# Patient Record
Sex: FEMALE | Race: WHITE | NOT HISPANIC OR LATINO
[De-identification: names, ages, dates, MRNs, and addresses within clinical notes are randomized per-mention and may not be internally consistent; named-entity substitution may affect disease eponyms.]

---

## 2018-04-12 ENCOUNTER — APPOINTMENT (OUTPATIENT)
Dept: PULMONOLOGY | Facility: CLINIC | Age: 78
End: 2018-04-12
Payer: MEDICARE

## 2018-04-12 VITALS
HEIGHT: 60 IN | DIASTOLIC BLOOD PRESSURE: 98 MMHG | TEMPERATURE: 98.2 F | HEART RATE: 67 BPM | WEIGHT: 164 LBS | RESPIRATION RATE: 14 BRPM | SYSTOLIC BLOOD PRESSURE: 140 MMHG | OXYGEN SATURATION: 98 % | BODY MASS INDEX: 32.2 KG/M2

## 2018-04-12 PROCEDURE — 94727 GAS DIL/WSHOT DETER LNG VOL: CPT

## 2018-04-12 PROCEDURE — 94729 DIFFUSING CAPACITY: CPT

## 2018-04-12 PROCEDURE — 94060 EVALUATION OF WHEEZING: CPT

## 2018-04-12 PROCEDURE — 99215 OFFICE O/P EST HI 40 MIN: CPT | Mod: 25

## 2018-04-12 RX ORDER — ALBUTEROL SULFATE 90 UG/1
108 (90 BASE) AEROSOL, METERED RESPIRATORY (INHALATION) EVERY 6 HOURS
Qty: 1 | Refills: 2 | Status: ACTIVE | COMMUNITY
Start: 2018-04-12 | End: 1900-01-01

## 2018-09-20 ENCOUNTER — FORM ENCOUNTER (OUTPATIENT)
Age: 78
End: 2018-09-20

## 2018-09-26 ENCOUNTER — FORM ENCOUNTER (OUTPATIENT)
Age: 78
End: 2018-09-26

## 2018-10-11 ENCOUNTER — APPOINTMENT (OUTPATIENT)
Dept: PULMONOLOGY | Facility: CLINIC | Age: 78
End: 2018-10-11

## 2018-11-30 ENCOUNTER — APPOINTMENT (OUTPATIENT)
Dept: PULMONOLOGY | Facility: CLINIC | Age: 78
End: 2018-11-30

## 2019-02-01 ENCOUNTER — APPOINTMENT (OUTPATIENT)
Dept: PULMONOLOGY | Facility: CLINIC | Age: 79
End: 2019-02-01
Payer: MEDICARE

## 2019-02-01 VITALS — SYSTOLIC BLOOD PRESSURE: 130 MMHG | DIASTOLIC BLOOD PRESSURE: 80 MMHG

## 2019-02-01 VITALS
TEMPERATURE: 98.5 F | RESPIRATION RATE: 14 BRPM | OXYGEN SATURATION: 96 % | HEART RATE: 67 BPM | WEIGHT: 157 LBS | BODY MASS INDEX: 30.66 KG/M2 | DIASTOLIC BLOOD PRESSURE: 90 MMHG | SYSTOLIC BLOOD PRESSURE: 130 MMHG

## 2019-02-01 DIAGNOSIS — R05 COUGH: ICD-10-CM

## 2019-02-01 DIAGNOSIS — Z86.39 PERSONAL HISTORY OF OTHER ENDOCRINE, NUTRITIONAL AND METABOLIC DISEASE: ICD-10-CM

## 2019-02-01 PROCEDURE — 94060 EVALUATION OF WHEEZING: CPT

## 2019-02-01 PROCEDURE — 99212 OFFICE O/P EST SF 10 MIN: CPT | Mod: 25

## 2019-02-01 PROCEDURE — 94729 DIFFUSING CAPACITY: CPT

## 2019-02-01 PROCEDURE — 99215 OFFICE O/P EST HI 40 MIN: CPT | Mod: 25

## 2019-02-01 PROCEDURE — 94727 GAS DIL/WSHOT DETER LNG VOL: CPT

## 2019-02-01 RX ORDER — METFORMIN HYDROCHLORIDE 500 MG/1
500 TABLET, COATED ORAL
Refills: 0 | Status: ACTIVE | COMMUNITY

## 2019-02-01 RX ORDER — ROSUVASTATIN CALCIUM 5 MG/1
TABLET, FILM COATED ORAL
Refills: 0 | Status: ACTIVE | COMMUNITY

## 2019-02-01 NOTE — DISCUSSION/SUMMARY
[FreeTextEntry1] : In summary she is a 78 year-old woman with type II diabetes, hyperlipidemia, mild obstructive airways disease and multiple small pulmonary nodules. She never smoked. \par \par Her airways disease has improved since she removed the carpeting and birds from her home. The pulmonary nodules have been stable.\par \par Can continue to use Ventolin as needed, although has not needed it. \par \par I will see her again in one year. \par \par

## 2019-02-01 NOTE — HISTORY OF PRESENT ILLNESS
[FreeTextEntry1] : She came for a follow up evaluation. \par \par She is feeling well. No cough, wheezing shortness of breath. Does not have any carpeting in the house. No longer has birds in her house. She never smoked.

## 2019-02-01 NOTE — REVIEW OF SYSTEMS
[Arthralgias] : arthralgias [Fatigue] : no fatigue [Nasal Congestion] : no nasal congestion [Cough] : no cough [Sputum] : not coughing up ~M sputum [Hemoptysis] : no hemoptysis [Dyspnea] : no dyspnea [Chest Tightness] : no chest tightness [Wheezing] : no wheezing [Hypertension] : no ~T hypertension [Chest Discomfort] : no chest discomfort [Heartburn] : no heartburn [Reflux] : no reflux [Dysuria] : no dysuria [Myalgias] : no myalgias [Rash] : no [unfilled] rash [Anemia] : no anemia [Headache] : no headache [Depression] : no depression [Snoring] : no snoring

## 2019-02-01 NOTE — PHYSICAL EXAM
[Normal Appearance] : normal appearance [Neck Appearance] : the appearance of the neck was normal [Jugular Venous Distention Increased] : there was no jugular-venous distention [Heart Sounds] : normal S1 and S2 [Auscultation Breath Sounds / Voice Sounds] : lungs were clear to auscultation bilaterally [Abdomen Tenderness] : non-tender [Abnormal Walk] : normal gait [Deep Tendon Reflexes (DTR)] : deep tendon reflexes were 2+ and symmetric [Oriented To Time, Place, And Person] : oriented to person, place, and time [Normal Oral Mucosa] : normal oral mucosa [Nail Clubbing] : no clubbing of the fingernails [Cyanosis, Localized] : no localized cyanosis [Skin Turgor] : normal skin turgor [] : no rash [Erythema] : no erythema of the pharynx

## 2019-02-01 NOTE — PROCEDURE
[FreeTextEntry1] : PFT 4/12/18: There was mild obstructive airways disease.  A significant improvement was noted after inhalation of a bronchodilator. Lung volumes were normal. Diffusion was mildly reduced.\par \par PFT 2/1/19: Small airways disease. Lung volumes were normal. Diffusion was normal. Mild response noted after bronchodilator. \par \par CT of the chest April 23, 2018: Multiple pulmonary nodules were noted stable when compared to the prior study of 3/31/16.  [place] to be due to a benign etiology.

## 2019-07-01 ENCOUNTER — RESULT REVIEW (OUTPATIENT)
Age: 79
End: 2019-07-01

## 2020-03-09 ENCOUNTER — APPOINTMENT (OUTPATIENT)
Dept: PULMONOLOGY | Facility: CLINIC | Age: 80
End: 2020-03-09
Payer: MEDICARE

## 2020-03-09 VITALS
WEIGHT: 153 LBS | BODY MASS INDEX: 29.88 KG/M2 | TEMPERATURE: 98.9 F | OXYGEN SATURATION: 96 % | DIASTOLIC BLOOD PRESSURE: 98 MMHG | HEART RATE: 61 BPM | SYSTOLIC BLOOD PRESSURE: 148 MMHG

## 2020-03-09 PROCEDURE — 99214 OFFICE O/P EST MOD 30 MIN: CPT

## 2020-03-09 NOTE — REASON FOR VISIT
[Initial Evaluation] : an initial evaluation [Cough] : cough [Follow-Up] : a follow-up visit [Abnormal CXR/ Chest CT] : an abnormal CXR/ chest CT

## 2020-03-09 NOTE — PHYSICAL EXAM
[Jugular Venous Distention Increased] : there was no jugular-venous distention [Heart Sounds] : normal S1 and S2 [Auscultation Breath Sounds / Voice Sounds] : lungs were clear to auscultation bilaterally [Abdomen Tenderness] : non-tender [Abnormal Walk] : normal gait [Deep Tendon Reflexes (DTR)] : deep tendon reflexes were 2+ and symmetric [Oriented To Time, Place, And Person] : oriented to person, place, and time [Normal Oral Mucosa] : normal oral mucosa [Nail Clubbing] : no clubbing of the fingernails [Skin Turgor] : normal skin turgor [] : no rash [General Appearance - In No Acute Distress] : no acute distress [Erythema] : no erythema of the pharynx [Cyanosis, Localized] : no localized cyanosis [Impaired Insight] : insight and judgment were intact

## 2020-03-09 NOTE — DISCUSSION/SUMMARY
[FreeTextEntry1] : In summary she is a 79 year-old woman with type II diabetes, hyperlipidemia, mild obstructive airways disease and multiple small pulmonary nodules. She never smoked. \par \par The airways disease improved when she removed the carpeting and birds from her home. The pulmonary nodules have been stable. Prefers no further monitoring. \par \par Can continue to use Ventolin as needed, although has not needed it. \par \par I will see her again in one year.

## 2020-03-09 NOTE — REVIEW OF SYSTEMS
[Arthralgias] : arthralgias [Fever] : no fever [Fatigue] : no fatigue [Nasal Congestion] : no nasal congestion [Cough] : no cough [Sputum] : not coughing up ~M sputum [Dyspnea] : no dyspnea [Chest Tightness] : no chest tightness [Wheezing] : no wheezing [Hypertension] : no ~T hypertension [Chest Discomfort] : no chest discomfort [Heartburn] : no heartburn [Reflux] : no reflux [Dysuria] : no dysuria [Myalgias] : no myalgias [Rash] : no [unfilled] rash [Anemia] : no anemia [Headache] : no headache [Depression] : no depression [Difficulty Initiating Sleep] : no difficulty falling asleep [Snoring] : no snoring

## 2020-03-09 NOTE — PROCEDURE
[FreeTextEntry1] : PFT 4/12/18: There was mild obstructive airways disease.  A significant improvement was noted after inhalation of a bronchodilator. Lung volumes were normal. Diffusion was mildly reduced.\par \par PFT 2/1/19: Small airways disease. Lung volumes were normal. Diffusion was normal. Mild response noted after bronchodilator. \par \par CT Chest 4/23/18: Multiple pulmonary nodules were noted stable when compared to the prior study of 3/31/16. The largest was 5 mm. Felt to be due to a benign etiology.

## 2021-03-17 ENCOUNTER — APPOINTMENT (OUTPATIENT)
Dept: PULMONOLOGY | Facility: CLINIC | Age: 81
End: 2021-03-17

## 2021-04-26 ENCOUNTER — NON-APPOINTMENT (OUTPATIENT)
Age: 81
End: 2021-04-26

## 2021-04-26 ENCOUNTER — APPOINTMENT (OUTPATIENT)
Dept: PULMONOLOGY | Facility: CLINIC | Age: 81
End: 2021-04-26
Payer: MEDICARE

## 2021-04-26 VITALS
HEART RATE: 84 BPM | SYSTOLIC BLOOD PRESSURE: 180 MMHG | WEIGHT: 152 LBS | TEMPERATURE: 98.6 F | DIASTOLIC BLOOD PRESSURE: 70 MMHG | BODY MASS INDEX: 29.69 KG/M2 | OXYGEN SATURATION: 97 %

## 2021-04-26 DIAGNOSIS — R06.02 SHORTNESS OF BREATH: ICD-10-CM

## 2021-04-26 DIAGNOSIS — R91.8 OTHER NONSPECIFIC ABNORMAL FINDING OF LUNG FIELD: ICD-10-CM

## 2021-04-26 PROCEDURE — 99072 ADDL SUPL MATRL&STAF TM PHE: CPT

## 2021-04-26 PROCEDURE — 99213 OFFICE O/P EST LOW 20 MIN: CPT

## 2021-04-26 RX ORDER — DORZOLAMIDE HYDROCHLORIDE TIMOLOL MALEATE 20; 5 MG/ML; MG/ML
22.3-6.8 SOLUTION/ DROPS OPHTHALMIC
Qty: 20 | Refills: 0 | Status: ACTIVE | COMMUNITY
Start: 2021-04-20

## 2021-04-26 RX ORDER — LATANOPROST/PF 0.005 %
0.01 DROPS OPHTHALMIC (EYE)
Qty: 7 | Refills: 0 | Status: ACTIVE | COMMUNITY
Start: 2021-04-20

## 2021-04-26 RX ORDER — TELMISARTAN AND HYDROCHLOROTHIAZIDE 40; 12.5 MG/1; MG/1
40-12.5 TABLET ORAL
Qty: 30 | Refills: 0 | Status: ACTIVE | COMMUNITY
Start: 2021-01-08

## 2021-04-26 NOTE — DISCUSSION/SUMMARY
[FreeTextEntry1] : She is a 81 year-old woman with history of hypertension, type II diabetes, hyperlipidemia, mild obstructive airways disease and multiple small pulmonary nodules. She never smoked. The airways disease improved when she removed carpeting and birds from her home. The pulmonary nodules have been stable. \par \par Presented with dyspnea on minimal exertion. Lungs were clear on exam. No other abnormal findings. \par \par Chest x-ray requested. A PFT will be obtained after a nasopharyngeal swab for COVID-19-PCR has been obtained and the result is negative. \par \par Follow up in 2- 4 weeks.

## 2021-04-26 NOTE — PHYSICAL EXAM
[General Appearance - In No Acute Distress] : no acute distress [Jugular Venous Distention Increased] : there was no jugular-venous distention [Heart Sounds] : normal S1 and S2 [Auscultation Breath Sounds / Voice Sounds] : lungs were clear to auscultation bilaterally [Abnormal Walk] : normal gait [Deep Tendon Reflexes (DTR)] : deep tendon reflexes were 2+ and symmetric [Oriented To Time, Place, And Person] : oriented to person, place, and time [Normal Oral Mucosa] : normal oral mucosa [Nail Clubbing] : no clubbing of the fingernails [Cyanosis, Localized] : no localized cyanosis [Skin Turgor] : normal skin turgor [] : no rash

## 2021-04-26 NOTE — REVIEW OF SYSTEMS
[Arthralgias] : arthralgias [Dyspnea] : dyspnea [Fever] : no fever [Nasal Congestion] : no nasal congestion [Cough] : no cough [Chest Tightness] : no chest tightness [Wheezing] : no wheezing [Hypertension] : no ~T hypertension [Chest Discomfort] : no chest discomfort [PND] : no PND [Palpitations] : no palpitations [Edema] : ~T edema was not present [Heartburn] : no heartburn [Reflux] : no reflux [Dysuria] : no dysuria [Myalgias] : no myalgias [Rash] : no [unfilled] rash [Anemia] : no anemia [Headache] : no headache [Depression] : no depression [Difficulty Initiating Sleep] : no difficulty falling asleep [Snoring] : no snoring

## 2021-04-26 NOTE — HISTORY OF PRESENT ILLNESS
[Never] : never [TextBox_4] : Complains of shortness of breath with minimal exertion and at rest. \par \par No coughing or wheezing. No complaint of chest pain or pressure. No fever, chills or sweats. No sick contacts. \par \par Recieved the COVID 19 vaccine.

## 2021-05-17 LAB — SARS-COV-2 N GENE NPH QL NAA+PROBE: NOT DETECTED

## 2022-10-11 DIAGNOSIS — Z86.39 PERSONAL HISTORY OF OTHER ENDOCRINE, NUTRITIONAL AND METABOLIC DISEASE: ICD-10-CM

## 2022-10-11 DIAGNOSIS — M20.5X9 OTHER DEFORMITIES OF TOE(S) (ACQUIRED), UNSPECIFIED FOOT: ICD-10-CM

## 2022-10-11 DIAGNOSIS — M20.42 OTHER HAMMER TOE(S) (ACQUIRED), RIGHT FOOT: ICD-10-CM

## 2022-10-11 DIAGNOSIS — M20.41 OTHER HAMMER TOE(S) (ACQUIRED), RIGHT FOOT: ICD-10-CM

## 2022-10-11 DIAGNOSIS — L85.1 ACQUIRED KERATOSIS [KERATODERMA] PALMARIS ET PLANTARIS: ICD-10-CM

## 2022-10-11 DIAGNOSIS — Z82.61 FAMILY HISTORY OF ARTHRITIS: ICD-10-CM

## 2022-10-17 ENCOUNTER — APPOINTMENT (OUTPATIENT)
Dept: PODIATRY | Facility: CLINIC | Age: 82
End: 2022-10-17

## 2022-10-17 VITALS — WEIGHT: 150 LBS | BODY MASS INDEX: 29.45 KG/M2 | HEIGHT: 60 IN

## 2022-10-17 DIAGNOSIS — M79.675 PAIN IN LEFT TOE(S): ICD-10-CM

## 2022-10-17 DIAGNOSIS — M20.40 OTHER HAMMER TOE(S) (ACQUIRED), UNSPECIFIED FOOT: ICD-10-CM

## 2022-10-17 PROCEDURE — 11056 PARNG/CUTG B9 HYPRKR LES 2-4: CPT

## 2022-10-17 PROCEDURE — 99202 OFFICE O/P NEW SF 15 MIN: CPT | Mod: 25

## 2022-10-20 PROBLEM — M20.40 HAMMERTOE: Status: ACTIVE | Noted: 2022-10-18

## 2022-10-20 PROBLEM — M79.675 PAIN OF TOE OF LEFT FOOT: Status: ACTIVE | Noted: 2022-10-18

## 2022-10-20 NOTE — ASSESSMENT
[FreeTextEntry1] : \par Impression: Keratosis. Diabetic neuropathy. Hammertoe deformity. Pain.\par \par Treatment: I trimmed keratotic lesions without incident. I debrided dystrophic nails. I discussed that she is a fall risk, diabetic neuropathy, and that her shoes are totally inadequate. I recommended getting new Memory Foam Skechers. I gave her the handout and the other shoes to purchase. She should soak with warm water and Epsom salt especially through the winter months. Follow-up for routine diabetic foot care.

## 2022-10-20 NOTE — PHYSICAL EXAM
[Delayed in the Right Toes] : capillary refills delayed in the right toes [Delayed in the Left Toes] : capillary refills delayed in the left toes [0] : left foot dorsalis pedis 0 [Vibration Dec.] : diminished vibratory sensation at the level of the toes [Position Sense Dec.] : diminished position sense at the level of the toes [Diminished Throughout Right Foot] : diminished sensation with monofilament testing throughout right foot [Diminished Throughout Left Foot] : diminished sensation with monofilament testing throughout left foot [FreeTextEntry3] : Absent hair growth.  Thin, atrophic skin. (Q8 Class Findings) [de-identified] : She has equinus and tight posterior muscle group. She has neuropathy with a cautious shuffling gait. 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 She has a preulcerative lesion. [FreeTextEntry1] : [place]-Todd monofilament testing absent at the hallux, 1st MPJ and heel bilateral.

## 2022-10-20 NOTE — HISTORY OF PRESENT ILLNESS
[Sneakers] : missy [FreeTextEntry1] : Patient presents today as an initial patient. She is diabetic with neuropathy. She was on vacation and did a lot of walking. She has bad keratosis plantar left 3rd toe.  She has not checked her sugars.

## 2023-01-10 ENCOUNTER — APPOINTMENT (OUTPATIENT)
Dept: PODIATRY | Facility: CLINIC | Age: 83
End: 2023-01-10
Payer: MEDICARE

## 2023-01-10 DIAGNOSIS — L85.1 ACQUIRED KERATOSIS [KERATODERMA] PALMARIS ET PLANTARIS: ICD-10-CM

## 2023-01-10 DIAGNOSIS — M79.674 PAIN IN RIGHT TOE(S): ICD-10-CM

## 2023-01-10 DIAGNOSIS — E11.49 TYPE 2 DIABETES MELLITUS WITH OTHER DIABETIC NEUROLOGICAL COMPLICATION: ICD-10-CM

## 2023-01-10 DIAGNOSIS — M79.675 PAIN IN RIGHT TOE(S): ICD-10-CM

## 2023-01-10 DIAGNOSIS — L60.3 NAIL DYSTROPHY: ICD-10-CM

## 2023-01-10 PROCEDURE — 11056 PARNG/CUTG B9 HYPRKR LES 2-4: CPT

## 2023-01-10 PROCEDURE — 11719 TRIM NAIL(S) ANY NUMBER: CPT | Mod: 59

## 2023-01-11 PROBLEM — L85.1 KERATODERMA, ACQUIRED: Status: ACTIVE | Noted: 2022-10-18

## 2023-01-11 PROBLEM — E11.49 DM (DIABETES MELLITUS), TYPE 2 WITH NEUROLOGICAL COMPLICATIONS: Status: ACTIVE | Noted: 2022-10-18

## 2023-01-11 PROBLEM — L60.3 NAIL DYSTROPHY: Status: ACTIVE | Noted: 2022-10-11

## 2023-01-13 PROBLEM — M79.674 PAIN IN TOES OF BOTH FEET: Status: ACTIVE | Noted: 2023-01-11

## 2023-01-13 NOTE — ASSESSMENT
[FreeTextEntry1] : \par Impression: Diabetes with neuropathy. IPK. Nail dystrophy. Pain.\par \par Treatment: I trimmed keratotic lesions x2. I debrided dystrophic nails. I put aperture pads on. I gave her tube foam protectors. I discussed memory foam shoes to keep pressure off of this area. She will follow my instructions. Follow-up for routine diabetic foot care.

## 2023-01-13 NOTE — HISTORY OF PRESENT ILLNESS
[Sneakers] : missy [FreeTextEntry1] : Patient presents today because of very painful lesions. She presents for diabetic foot care. She has diabetic neuropathy but still has painful keratotic lesions. She does not know her A1c and does not check her fasting sugars. She last saw her PCP, Dr. Garza 2 months ago.\par \par

## 2023-01-13 NOTE — PHYSICAL EXAM
[Delayed in the Right Toes] : capillary refills delayed in the right toes [Delayed in the Left Toes] : capillary refills delayed in the left toes [0] : left foot dorsalis pedis 0 [Vibration Dec.] : diminished vibratory sensation at the level of the toes [Position Sense Dec.] : diminished position sense at the level of the toes [Diminished Throughout Right Foot] : diminished sensation with monofilament testing throughout right foot [Diminished Throughout Left Foot] : diminished sensation with monofilament testing throughout left foot [FreeTextEntry3] : Absent hair growth.  Thin, atrophic skin. The patient has a class finding of Q8-Two Class B findings.  [de-identified] : Arthritic left 3rd toe with painful keratotic lesion. [FreeTextEntry1] : [place]-Todd monofilament testing absent at the hallux, 1st MPJ and heel bilateral.

## 2023-04-08 ENCOUNTER — OFFICE (OUTPATIENT)
Dept: URBAN - METROPOLITAN AREA CLINIC 90 | Facility: CLINIC | Age: 83
Setting detail: OPHTHALMOLOGY
End: 2023-04-08
Payer: MEDICARE

## 2023-04-08 DIAGNOSIS — H40.1111: ICD-10-CM

## 2023-04-08 DIAGNOSIS — H40.1122: ICD-10-CM

## 2023-04-08 DIAGNOSIS — H01.001: ICD-10-CM

## 2023-04-08 DIAGNOSIS — H02.403: ICD-10-CM

## 2023-04-08 DIAGNOSIS — H16.223: ICD-10-CM

## 2023-04-08 DIAGNOSIS — E11.9: ICD-10-CM

## 2023-04-08 DIAGNOSIS — H01.005: ICD-10-CM

## 2023-04-08 DIAGNOSIS — H26.493: ICD-10-CM

## 2023-04-08 DIAGNOSIS — D31.32: ICD-10-CM

## 2023-04-08 DIAGNOSIS — H01.002: ICD-10-CM

## 2023-04-08 DIAGNOSIS — H01.004: ICD-10-CM

## 2023-04-08 DIAGNOSIS — H43.813: ICD-10-CM

## 2023-04-08 PROCEDURE — 92250 FUNDUS PHOTOGRAPHY W/I&R: CPT | Performed by: OPHTHALMOLOGY

## 2023-04-08 PROCEDURE — 92014 COMPRE OPH EXAM EST PT 1/>: CPT | Performed by: OPHTHALMOLOGY

## 2023-04-08 ASSESSMENT — VISUAL ACUITY
OD_BCVA: 20/30-2
OS_BCVA: 20/40

## 2023-04-08 ASSESSMENT — REFRACTION_MANIFEST
OS_VA2: 20/25
OS_CYLINDER: +0.50
OD_SPHERE: +0.25
OS_ADD: +3.00
OD_CYLINDER: -0.25
OD_SPHERE: +0.25
OS_CYLINDER: -0.75
OD_AXIS: 180
OS_VA1: 20/25
OD_AXIS: 025
OD_ADD: +3.00
OS_VA2: 20/25
OS_SPHERE: PLANO
OS_AXIS: 145
OD_CYLINDER: +0.25
OD_VA1: 20/30
OS_VA2: 20/20
OS_SPHERE: +0.25
OD_VA2: 20/20
OU_VA: 20/30+1
OD_ADD: +3.00
OS_SPHERE: PL
OD_VA2: 20/25
OS_ADD: +3.00
OD_SPHERE: PLANO
OS_SPHERE: PLANO
OD_ADD: +3.00
OS_ADD: +3.00
OD_VA2: 20/25
OD_SPHERE: PLANO
OS_AXIS: 040

## 2023-04-08 ASSESSMENT — REFRACTION_CURRENTRX
OD_SPHERE: PLANO
OD_OVR_VA: 20/
OD_ADD: +2.00
OD_ADD: +2.75
OS_OVR_VA: 20/
OD_CYLINDER: SPHERE
OD_SPHERE: PLANO
OS_CYLINDER: -0.25
OD_OVR_VA: 20/
OD_VPRISM_DIRECTION: PROGS
OS_CYLINDER: SPH
OS_ADD: +2.75
OS_OVR_VA: 20/
OS_ADD: +2.00
OS_OVR_VA: 20/
OS_SPHERE: PLANO
OD_CYLINDER: SPH
OD_SPHERE: PLANO
OS_VPRISM_DIRECTION: PROGS
OD_VPRISM_DIRECTION: PROGS
OS_CYLINDER: SPHERE
OS_VPRISM_DIRECTION: PROGS
OS_AXIS: 098
OD_OVR_VA: 20/
OD_ADD: +2.75
OD_VPRISM_DIRECTION: PROGS
OS_VPRISM_DIRECTION: PROGS
OS_SPHERE: PLANO
OS_ADD: +2.75
OS_SPHERE: 0+0.25

## 2023-04-08 ASSESSMENT — LID POSITION - PTOSIS
OD_PTOSIS: RUL 1+
OS_PTOSIS: LUL 1+

## 2023-04-08 ASSESSMENT — REFRACTION_AUTOREFRACTION
OS_CYLINDER: -1.25
OD_SPHERE: +0.50
OD_AXIS: 063
OS_SPHERE: +0.25
OD_CYLINDER: -1.25
OS_AXIS: 152

## 2023-04-08 ASSESSMENT — AXIALLENGTH_DERIVED
OD_AL: 23.8621
OS_AL: 23.8792
OD_AL: 23.9621
OD_AL: 24.0631
OS_AL: 23.7798

## 2023-04-08 ASSESSMENT — LID POSITION - LOWER LID LAG
OD_LOWER_LID_LAG: RLL 1+
OS_LOWER_LID_LAG: LLL 1+

## 2023-04-08 ASSESSMENT — DRY EYES - PHYSICIAN NOTES
OS_GENERALCOMMENTS: INFERIOR
OD_GENERALCOMMENTS: INFERIOR

## 2023-04-08 ASSESSMENT — LID EXAM ASSESSMENTS
OS_BLEPHARITIS: LLL LUL 3+
OD_BLEPHARITIS: RLL RUL 3+

## 2023-04-08 ASSESSMENT — KERATOMETRY
OS_K1POWER_DIOPTERS: 42.75
OD_AXISANGLE_DEGREES: 098
OD_K2POWER_DIOPTERS: 42.75
OS_K2POWER_DIOPTERS: 43.50
METHOD_AUTO_MANUAL: AUTO
OS_AXISANGLE_DEGREES: 075
OD_K1POWER_DIOPTERS: 42.00

## 2023-04-08 ASSESSMENT — SUPERFICIAL PUNCTATE KERATITIS (SPK)
OD_SPK: 1+
OS_SPK: 1+

## 2023-04-08 ASSESSMENT — SPHEQUIV_DERIVED
OS_SPHEQUIV: -0.125
OD_SPHEQUIV: 0.375
OD_SPHEQUIV: -0.125
OS_SPHEQUIV: -0.375
OD_SPHEQUIV: 0.125

## 2023-04-08 ASSESSMENT — TONOMETRY
OD_IOP_MMHG: 16
OS_IOP_MMHG: 17

## 2023-04-27 ENCOUNTER — OFFICE (OUTPATIENT)
Dept: URBAN - METROPOLITAN AREA CLINIC 90 | Facility: CLINIC | Age: 83
Setting detail: OPHTHALMOLOGY
End: 2023-04-27
Payer: MEDICARE

## 2023-04-27 DIAGNOSIS — H26.492: ICD-10-CM

## 2023-04-27 PROCEDURE — 66821 AFTER CATARACT LASER SURGERY: CPT | Performed by: OPHTHALMOLOGY

## 2023-04-27 ASSESSMENT — SPHEQUIV_DERIVED
OS_SPHEQUIV: -0.375
OD_SPHEQUIV: 0.125
OS_SPHEQUIV: -0.125
OD_SPHEQUIV: 0.375
OD_SPHEQUIV: -0.125

## 2023-04-27 ASSESSMENT — REFRACTION_MANIFEST
OD_SPHERE: +0.25
OS_VA2: 20/25
OD_VA2: 20/25
OS_VA2: 20/20
OS_ADD: +3.00
OD_ADD: +3.00
OU_VA: 20/30+1
OD_ADD: +3.00
OS_SPHERE: PLANO
OD_SPHERE: +0.25
OD_VA2: 20/25
OS_AXIS: 145
OD_ADD: +3.00
OS_ADD: +3.00
OS_AXIS: 040
OD_AXIS: 180
OD_VA1: 20/30
OD_AXIS: 025
OS_VA2: 20/25
OD_CYLINDER: -0.25
OD_VA2: 20/20
OD_SPHERE: PLANO
OS_SPHERE: +0.25
OS_SPHERE: PLANO
OS_CYLINDER: +0.50
OD_SPHERE: PLANO
OD_CYLINDER: +0.25
OS_ADD: +3.00
OS_SPHERE: PL
OS_CYLINDER: -0.75
OS_VA1: 20/25

## 2023-04-27 ASSESSMENT — KERATOMETRY
OD_AXISANGLE_DEGREES: 098
OS_K1POWER_DIOPTERS: 42.75
OD_K1POWER_DIOPTERS: 42.00
OS_AXISANGLE_DEGREES: 075
OS_K2POWER_DIOPTERS: 43.50
OD_K2POWER_DIOPTERS: 42.75
METHOD_AUTO_MANUAL: AUTO

## 2023-04-27 ASSESSMENT — TONOMETRY: OS_IOP_MMHG: 16

## 2023-04-27 ASSESSMENT — REFRACTION_CURRENTRX
OD_OVR_VA: 20/
OS_OVR_VA: 20/
OS_ADD: +2.75
OD_OVR_VA: 20/
OS_ADD: +2.00
OD_SPHERE: PLANO
OS_SPHERE: PLANO
OS_OVR_VA: 20/
OD_VPRISM_DIRECTION: PROGS
OD_CYLINDER: SPHERE
OS_ADD: +2.75
OS_VPRISM_DIRECTION: PROGS
OD_VPRISM_DIRECTION: PROGS
OS_OVR_VA: 20/
OD_ADD: +2.75
OD_OVR_VA: 20/
OS_CYLINDER: -0.25
OS_AXIS: 098
OD_ADD: +2.00
OS_CYLINDER: SPHERE
OD_VPRISM_DIRECTION: PROGS
OS_SPHERE: 0+0.25
OD_ADD: +2.75
OS_VPRISM_DIRECTION: PROGS
OS_SPHERE: PLANO
OS_VPRISM_DIRECTION: PROGS
OD_SPHERE: PLANO
OD_CYLINDER: SPH
OD_SPHERE: PLANO
OS_CYLINDER: SPH

## 2023-04-27 ASSESSMENT — SUPERFICIAL PUNCTATE KERATITIS (SPK)
OD_SPK: 1+
OS_SPK: 1+

## 2023-04-27 ASSESSMENT — AXIALLENGTH_DERIVED
OD_AL: 24.0631
OS_AL: 23.7798
OD_AL: 23.8621
OD_AL: 23.9621
OS_AL: 23.8792

## 2023-04-27 ASSESSMENT — REFRACTION_AUTOREFRACTION
OS_SPHERE: +0.25
OS_AXIS: 152
OD_AXIS: 063
OS_CYLINDER: -1.25
OD_SPHERE: +0.50
OD_CYLINDER: -1.25

## 2023-04-27 ASSESSMENT — LID POSITION - PTOSIS
OD_PTOSIS: RUL 1+
OS_PTOSIS: LUL 1+

## 2023-04-27 ASSESSMENT — LID EXAM ASSESSMENTS
OD_BLEPHARITIS: RLL RUL 3+
OS_BLEPHARITIS: LLL LUL 3+

## 2023-04-27 ASSESSMENT — VISUAL ACUITY
OS_BCVA: 20/30-2
OD_BCVA: 20/40

## 2023-04-27 ASSESSMENT — DRY EYES - PHYSICIAN NOTES
OD_GENERALCOMMENTS: INFERIOR
OS_GENERALCOMMENTS: INFERIOR

## 2023-04-27 ASSESSMENT — LID POSITION - LOWER LID LAG
OD_LOWER_LID_LAG: RLL 1+
OS_LOWER_LID_LAG: LLL 1+

## 2023-05-04 ENCOUNTER — OFFICE (OUTPATIENT)
Dept: URBAN - METROPOLITAN AREA CLINIC 90 | Facility: CLINIC | Age: 83
Setting detail: OPHTHALMOLOGY
End: 2023-05-04
Payer: MEDICARE

## 2023-05-04 DIAGNOSIS — H26.492: ICD-10-CM

## 2023-05-04 DIAGNOSIS — H40.1122: ICD-10-CM

## 2023-05-04 DIAGNOSIS — H40.1111: ICD-10-CM

## 2023-05-04 PROBLEM — H01.002 BLEPHARITIS; RIGHT UPPER LID, RIGHT LOWER LID, LEFT UPPER LID, LEFT LOWER LID: Status: ACTIVE | Noted: 2023-04-08

## 2023-05-04 PROBLEM — H01.004 BLEPHARITIS; RIGHT UPPER LID, RIGHT LOWER LID, LEFT UPPER LID, LEFT LOWER LID: Status: ACTIVE | Noted: 2023-04-08

## 2023-05-04 PROBLEM — H02.403 PTOSIS OF EYELID, UNSPECIFIED; BOTH EYES: Status: ACTIVE | Noted: 2023-04-08

## 2023-05-04 PROBLEM — D31.32 CHOROIDAL NEVUS, LEFT EYE: Status: ACTIVE | Noted: 2023-04-08

## 2023-05-04 PROBLEM — H01.005 BLEPHARITIS; RIGHT UPPER LID, RIGHT LOWER LID, LEFT UPPER LID, LEFT LOWER LID: Status: ACTIVE | Noted: 2023-04-08

## 2023-05-04 PROBLEM — H16.222 DRY EYE SYNDROME K SICCA;  , LEFT EYE: Status: ACTIVE | Noted: 2023-04-27

## 2023-05-04 PROBLEM — H01.001 BLEPHARITIS; RIGHT UPPER LID, RIGHT LOWER LID, LEFT UPPER LID, LEFT LOWER LID: Status: ACTIVE | Noted: 2023-04-08

## 2023-05-04 PROCEDURE — 99024 POSTOP FOLLOW-UP VISIT: CPT | Performed by: OPHTHALMOLOGY

## 2023-05-04 ASSESSMENT — REFRACTION_CURRENTRX
OS_VPRISM_DIRECTION: PROGS
OD_CYLINDER: SPH
OD_VPRISM_DIRECTION: PROGS
OS_SPHERE: PLANO
OS_CYLINDER: SPHERE
OS_ADD: +2.75
OS_OVR_VA: 20/
OD_SPHERE: PLANO
OS_CYLINDER: -0.25
OD_ADD: +2.75
OS_VPRISM_DIRECTION: PROGS
OD_OVR_VA: 20/
OS_VPRISM_DIRECTION: PROGS
OS_ADD: +2.00
OS_SPHERE: PLANO
OS_OVR_VA: 20/
OD_CYLINDER: SPHERE
OS_AXIS: 085
OS_CYLINDER: SPH
OD_ADD: +2.75
OD_CYLINDER: -0.25
OD_VPRISM_DIRECTION: PROGS
OD_OVR_VA: 20/
OD_ADD: +2.00
OS_SPHERE: +0.75
OD_OVR_VA: 20/
OS_OVR_VA: 20/
OD_AXIS: 099
OD_SPHERE: +0.25
OD_SPHERE: PLANO
OS_ADD: +2.75
OD_VPRISM_DIRECTION: PROGS

## 2023-05-04 ASSESSMENT — KERATOMETRY
OD_AXISANGLE_DEGREES: 098
OS_K2POWER_DIOPTERS: 43.50
OS_AXISANGLE_DEGREES: 075
OD_K1POWER_DIOPTERS: 42.00
OD_K2POWER_DIOPTERS: 42.75
OS_K1POWER_DIOPTERS: 42.75
METHOD_AUTO_MANUAL: AUTO

## 2023-05-04 ASSESSMENT — REFRACTION_AUTOREFRACTION
OS_AXIS: 152
OS_SPHERE: +0.25
OD_AXIS: 063
OD_SPHERE: +0.50
OS_CYLINDER: -1.25
OD_CYLINDER: -1.25

## 2023-05-04 ASSESSMENT — SPHEQUIV_DERIVED
OD_SPHEQUIV: 0.125
OD_SPHEQUIV: 0.375
OD_SPHEQUIV: -0.125
OS_SPHEQUIV: -0.375
OS_SPHEQUIV: -0.125

## 2023-05-04 ASSESSMENT — REFRACTION_MANIFEST
OS_SPHERE: PLANO
OD_AXIS: 025
OU_VA: 20/30+1
OD_VA2: 20/20
OS_VA1: 20/25
OS_VA2: 20/25
OS_CYLINDER: +0.50
OS_VA2: 20/25
OS_ADD: +3.00
OD_VA2: 20/25
OD_VA1: 20/30
OS_VA2: 20/20
OD_ADD: +3.00
OS_SPHERE: PLANO
OD_ADD: +3.00
OD_SPHERE: PLANO
OD_AXIS: 180
OD_CYLINDER: -0.25
OS_ADD: +3.00
OD_SPHERE: +0.25
OS_AXIS: 040
OD_SPHERE: +0.25
OS_SPHERE: PL
OD_ADD: +3.00
OD_VA2: 20/25
OS_AXIS: 145
OD_SPHERE: PLANO
OS_SPHERE: +0.25
OS_CYLINDER: -0.75
OD_CYLINDER: +0.25
OS_ADD: +3.00

## 2023-05-04 ASSESSMENT — DRY EYES - PHYSICIAN NOTES
OD_GENERALCOMMENTS: INFERIOR
OS_GENERALCOMMENTS: INFERIOR

## 2023-05-04 ASSESSMENT — AXIALLENGTH_DERIVED
OD_AL: 23.8621
OD_AL: 23.9621
OD_AL: 24.0631
OS_AL: 23.8792
OS_AL: 23.7798

## 2023-05-04 ASSESSMENT — VISUAL ACUITY
OD_BCVA: 20/30-1
OS_BCVA: 20/30-2

## 2023-05-04 ASSESSMENT — CONFRONTATIONAL VISUAL FIELD TEST (CVF)
OS_FINDINGS: FULL
OD_FINDINGS: FULL

## 2023-05-04 ASSESSMENT — SUPERFICIAL PUNCTATE KERATITIS (SPK)
OD_SPK: 1+
OS_SPK: 1+

## 2023-05-04 ASSESSMENT — TONOMETRY: OS_IOP_MMHG: 18

## 2023-11-02 ENCOUNTER — OFFICE (OUTPATIENT)
Dept: URBAN - METROPOLITAN AREA CLINIC 90 | Facility: CLINIC | Age: 83
Setting detail: OPHTHALMOLOGY
End: 2023-11-02
Payer: MEDICARE

## 2023-11-02 DIAGNOSIS — H01.001: ICD-10-CM

## 2023-11-02 DIAGNOSIS — H01.004: ICD-10-CM

## 2023-11-02 DIAGNOSIS — E11.9: ICD-10-CM

## 2023-11-02 DIAGNOSIS — H16.222: ICD-10-CM

## 2023-11-02 DIAGNOSIS — H40.1111: ICD-10-CM

## 2023-11-02 DIAGNOSIS — H40.1122: ICD-10-CM

## 2023-11-02 DIAGNOSIS — H01.002: ICD-10-CM

## 2023-11-02 DIAGNOSIS — H01.005: ICD-10-CM

## 2023-11-02 DIAGNOSIS — H26.491: ICD-10-CM

## 2023-11-02 DIAGNOSIS — H02.403: ICD-10-CM

## 2023-11-02 PROCEDURE — 92012 INTRM OPH EXAM EST PATIENT: CPT | Performed by: OPHTHALMOLOGY

## 2023-11-02 ASSESSMENT — REFRACTION_MANIFEST
OD_ADD: +3.00
OD_SPHERE: +0.25
OS_ADD: +3.00
OS_SPHERE: PL
OS_VA2: 20/20
OS_VA2: 20/25
OS_VA2: 20/25
OS_VA1: 20/25
OD_CYLINDER: SPHERE
OS_CYLINDER: SPHERE
OD_AXIS: 025
OD_VA2: 20/20
OS_SPHERE: +0.25
OS_AXIS: 040
OD_ADD: +3.00
OS_VA1: 20/25
OD_CYLINDER: +0.25
OD_SPHERE: PLANO
OS_SPHERE: PLANO
OD_CYLINDER: -0.25
OS_ADD: +3.00
OS_ADD: +3.00
OD_ADD: +3.00
OD_VA1: 20/25
OD_AXIS: 180
OU_VA: 20/30+1
OD_VA2: 20/25
OS_CYLINDER: +0.50
OD_SPHERE: PLANO
OS_CYLINDER: -0.75
OS_SPHERE: PLANO
OD_SPHERE: PLANO
OD_SPHERE: +0.25
OS_AXIS: 145
OD_VA1: 20/30
OD_VA2: 20/25
OS_SPHERE: PLANO

## 2023-11-02 ASSESSMENT — SPHEQUIV_DERIVED
OD_SPHEQUIV: 0.375
OS_SPHEQUIV: -0.125
OD_SPHEQUIV: 0.125
OS_SPHEQUIV: -0.25

## 2023-11-02 ASSESSMENT — LID POSITION - PTOSIS
OS_PTOSIS: LUL 1+
OD_PTOSIS: RUL 1+

## 2023-11-02 ASSESSMENT — REFRACTION_CURRENTRX
OD_VPRISM_DIRECTION: PROGS
OS_ADD: +2.25
OD_CYLINDER: -0.25
OD_ADD: +2.00
OD_SPHERE: PLANO
OS_CYLINDER: -0.25
OS_ADD: +2.75
OS_ADD: +2.00
OS_AXIS: 090
OS_SPHERE: PLANO
OD_ADD: +2.75
OD_VPRISM_DIRECTION: PROGS
OD_OVR_VA: 20/
OD_ADD: +2.25
OS_OVR_VA: 20/
OS_CYLINDER: -0.25
OS_VPRISM_DIRECTION: PROGS
OD_SPHERE: PLANO
OS_VPRISM_DIRECTION: PROGS
OD_CYLINDER: SPHERE
OD_AXIS: 090
OS_SPHERE: +0.75
OS_OVR_VA: 20/
OD_OVR_VA: 20/
OD_CYLINDER: -0.25
OS_CYLINDER: SPH
OS_AXIS: 085
OS_SPHERE: PLANO
OD_SPHERE: PLANO
OS_VPRISM_DIRECTION: PROGS
OD_VPRISM_DIRECTION: PROGS
OS_SPHERE: PLANO
OD_OVR_VA: 20/
OS_CYLINDER: SPHERE
OD_AXIS: 099
OS_VPRISM_DIRECTION: PROGS
OD_SPHERE: +0.25
OS_OVR_VA: 20/
OS_ADD: +2.75
OD_CYLINDER: SPH
OD_VPRISM_DIRECTION: PROGS
OD_ADD: +2.75

## 2023-11-02 ASSESSMENT — SUPERFICIAL PUNCTATE KERATITIS (SPK)
OD_SPK: 1+
OS_SPK: 1+

## 2023-11-02 ASSESSMENT — REFRACTION_AUTOREFRACTION
OS_SPHERE: 0.00
OD_SPHERE: +0.25
OS_AXIS: 144
OD_AXIS: 065
OS_CYLINDER: -0.50
OD_CYLINDER: --1.00

## 2023-11-02 ASSESSMENT — DRY EYES - PHYSICIAN NOTES
OD_GENERALCOMMENTS: INFERIOR
OS_GENERALCOMMENTS: INFERIOR

## 2023-11-02 ASSESSMENT — LID EXAM ASSESSMENTS
OD_BLEPHARITIS: RLL RUL 3+
OS_BLEPHARITIS: LLL LUL 3+

## 2023-11-02 ASSESSMENT — CONFRONTATIONAL VISUAL FIELD TEST (CVF)
OD_FINDINGS: FULL
OS_FINDINGS: FULL

## 2023-11-02 ASSESSMENT — LID POSITION - LOWER LID LAG
OS_LOWER_LID_LAG: LLL 1+
OD_LOWER_LID_LAG: RLL 1+

## 2024-04-23 ENCOUNTER — OFFICE (OUTPATIENT)
Dept: URBAN - METROPOLITAN AREA CLINIC 90 | Facility: CLINIC | Age: 84
Setting detail: OPHTHALMOLOGY
End: 2024-04-23
Payer: MEDICARE

## 2024-04-23 DIAGNOSIS — H01.004: ICD-10-CM

## 2024-04-23 DIAGNOSIS — H16.222: ICD-10-CM

## 2024-04-23 DIAGNOSIS — E11.9: ICD-10-CM

## 2024-04-23 DIAGNOSIS — H01.001: ICD-10-CM

## 2024-04-23 DIAGNOSIS — H40.1122: ICD-10-CM

## 2024-04-23 DIAGNOSIS — H02.403: ICD-10-CM

## 2024-04-23 DIAGNOSIS — H26.491: ICD-10-CM

## 2024-04-23 DIAGNOSIS — H01.005: ICD-10-CM

## 2024-04-23 DIAGNOSIS — H40.1111: ICD-10-CM

## 2024-04-23 DIAGNOSIS — H01.002: ICD-10-CM

## 2024-04-23 PROCEDURE — 92083 EXTENDED VISUAL FIELD XM: CPT | Performed by: OPHTHALMOLOGY

## 2024-04-23 PROCEDURE — 92133 CPTRZD OPH DX IMG PST SGM ON: CPT | Performed by: OPHTHALMOLOGY

## 2024-04-23 PROCEDURE — 92014 COMPRE OPH EXAM EST PT 1/>: CPT | Performed by: OPHTHALMOLOGY

## 2024-04-23 ASSESSMENT — LID POSITION - LOWER LID LAG
OD_LOWER_LID_LAG: RLL 1+
OS_LOWER_LID_LAG: LLL 1+

## 2024-04-23 ASSESSMENT — LID POSITION - PTOSIS
OD_PTOSIS: RUL 1+
OS_PTOSIS: LUL 1+

## 2024-04-23 ASSESSMENT — LID EXAM ASSESSMENTS
OD_BLEPHARITIS: RLL RUL 3+
OS_BLEPHARITIS: LLL LUL 3+

## 2024-11-05 ENCOUNTER — OFFICE (OUTPATIENT)
Age: 84
Setting detail: OPHTHALMOLOGY
End: 2024-11-05
Payer: MEDICARE

## 2024-11-05 DIAGNOSIS — H01.005: ICD-10-CM

## 2024-11-05 DIAGNOSIS — H02.403: ICD-10-CM

## 2024-11-05 DIAGNOSIS — H40.1122: ICD-10-CM

## 2024-11-05 DIAGNOSIS — H01.001: ICD-10-CM

## 2024-11-05 DIAGNOSIS — H01.002: ICD-10-CM

## 2024-11-05 DIAGNOSIS — H01.004: ICD-10-CM

## 2024-11-05 DIAGNOSIS — E11.9: ICD-10-CM

## 2024-11-05 DIAGNOSIS — H26.491: ICD-10-CM

## 2024-11-05 DIAGNOSIS — H40.1111: ICD-10-CM

## 2024-11-05 DIAGNOSIS — H16.223: ICD-10-CM

## 2024-11-05 PROCEDURE — 92012 INTRM OPH EXAM EST PATIENT: CPT | Performed by: OPHTHALMOLOGY

## 2024-11-05 ASSESSMENT — REFRACTION_CURRENTRX
OS_OVR_VA: 20/
OD_ADD: +2.75
OD_OVR_VA: 20/
OS_ADD: +2.75
OD_CYLINDER: -0.25
OD_CYLINDER: -0.75
OD_VPRISM_DIRECTION: PROGS
OD_SPHERE: +0.25
OS_SPHERE: PLANO
OD_AXIS: 090
OS_VPRISM_DIRECTION: PROGS
OS_OVR_VA: 20/
OS_VPRISM_DIRECTION: PROGS
OS_CYLINDER: SPH
OD_AXIS: 105
OS_SPHERE: PLANO
OS_CYLINDER: -0.25
OS_SPHERE: +0.75
OD_CYLINDER: SPHERE
OS_ADD: +2.25
OD_SPHERE: PLANO
OS_VPRISM_DIRECTION: PROGS
OD_VPRISM_DIRECTION: PROGS
OD_SPHERE: PLANO
OS_CYLINDER: SPHERE
OD_OVR_VA: 20/
OD_VPRISM_DIRECTION: PROGS
OS_SPHERE: PLANO
OD_VPRISM_DIRECTION: PROGS
OD_ADD: +2.00
OD_CYLINDER: -0.25
OS_VPRISM_DIRECTION: PROGS
OD_AXIS: 099
OS_CYLINDER: SPHERE
OS_CYLINDER: -0.25
OS_AXIS: 085
OS_ADD: +2.75
OD_SPHERE: +1.25
OS_ADD: +2.00
OD_CYLINDER: SPH
OS_ADD: +2.25
OD_VPRISM_DIRECTION: PROGS
OD_ADD: +2.25
OD_SPHERE: PLANO
OS_SPHERE: PLANO
OS_VPRISM_DIRECTION: PROGS
OD_ADD: +2.25
OS_OVR_VA: 20/
OD_OVR_VA: 20/
OS_AXIS: 090
OD_ADD: +2.75

## 2024-11-05 ASSESSMENT — REFRACTION_MANIFEST
OD_ADD: +3.00
OS_SPHERE: PLANO
OS_SPHERE: PLANO
OD_VA1: 20/30
OS_VA1: 20/25
OS_SPHERE: PL
OS_CYLINDER: +0.50
OS_CYLINDER: SPHERE
OS_VA2: 20/20
OD_AXIS: 180
OD_VA2: 20/25
OD_ADD: +3.00
OD_SPHERE: +0.25
OS_SPHERE: +0.25
OS_AXIS: 145
OD_CYLINDER: SPHERE
OD_VA2: 20/25
OD_SPHERE: +0.25
OS_VA2: 20/25
OS_SPHERE: PLANO
OS_VA1: 20/25
OD_SPHERE: PLANO
OS_ADD: +3.00
OD_AXIS: 025
OD_CYLINDER: +0.25
OS_CYLINDER: -0.75
OD_SPHERE: PLANO
OS_AXIS: 040
OD_CYLINDER: -0.25
OD_SPHERE: PLANO
OD_VA2: 20/20
OD_VA1: 20/25
OU_VA: 20/30+1
OS_VA2: 20/25
OS_ADD: +3.00
OD_ADD: +3.00
OS_ADD: +3.00

## 2024-11-05 ASSESSMENT — REFRACTION_AUTOREFRACTION
OS_CYLINDER: -0.50
OD_AXIS: 049
OD_CYLINDER: -0.75
OS_SPHERE: -0.25
OS_AXIS: 144
OD_SPHERE: +0.25

## 2024-11-05 ASSESSMENT — KERATOMETRY
OS_K1POWER_DIOPTERS: 42.75
OS_AXISANGLE_DEGREES: 079
METHOD_AUTO_MANUAL: AUTO
OD_K2POWER_DIOPTERS: 42.50
OD_AXISANGLE_DEGREES: 099
OD_K1POWER_DIOPTERS: 42.00
OS_K2POWER_DIOPTERS: 43.50

## 2024-11-05 ASSESSMENT — TONOMETRY
OD_IOP_MMHG: 16
OS_IOP_MMHG: 18
OD_IOP_MMHG: 16
OS_IOP_MMHG: 18

## 2024-11-05 ASSESSMENT — VISUAL ACUITY
OS_BCVA: 20/25-1
OD_BCVA: 20/25

## 2024-11-05 ASSESSMENT — LID POSITION - LOWER LID LAG
OS_LOWER_LID_LAG: LLL 1+
OD_LOWER_LID_LAG: RLL 1+

## 2024-11-05 ASSESSMENT — SUPERFICIAL PUNCTATE KERATITIS (SPK)
OS_SPK: 1+
OD_SPK: 1+

## 2024-11-05 ASSESSMENT — LID EXAM ASSESSMENTS
OS_BLEPHARITIS: LLL LUL 3+
OD_BLEPHARITIS: RLL RUL 3+

## 2024-11-05 ASSESSMENT — DRY EYES - PHYSICIAN NOTES
OD_GENERALCOMMENTS: INFERIOR
OS_GENERALCOMMENTS: INFERIOR

## 2024-11-05 ASSESSMENT — LID POSITION - PTOSIS
OD_PTOSIS: RUL 1+
OS_PTOSIS: LUL 1+

## 2025-05-07 ENCOUNTER — OFFICE (OUTPATIENT)
Facility: LOCATION | Age: 85
Setting detail: OPHTHALMOLOGY
End: 2025-05-07
Payer: MEDICARE

## 2025-05-07 DIAGNOSIS — H43.813: ICD-10-CM

## 2025-05-07 DIAGNOSIS — H40.1122: ICD-10-CM

## 2025-05-07 DIAGNOSIS — H01.004: ICD-10-CM

## 2025-05-07 DIAGNOSIS — H40.1111: ICD-10-CM

## 2025-05-07 DIAGNOSIS — E11.9: ICD-10-CM

## 2025-05-07 DIAGNOSIS — H01.005: ICD-10-CM

## 2025-05-07 DIAGNOSIS — H02.403: ICD-10-CM

## 2025-05-07 DIAGNOSIS — H01.002: ICD-10-CM

## 2025-05-07 DIAGNOSIS — H26.491: ICD-10-CM

## 2025-05-07 DIAGNOSIS — H01.001: ICD-10-CM

## 2025-05-07 DIAGNOSIS — D31.32: ICD-10-CM

## 2025-05-07 DIAGNOSIS — H16.223: ICD-10-CM

## 2025-05-07 PROCEDURE — 92133 CPTRZD OPH DX IMG PST SGM ON: CPT | Performed by: OPHTHALMOLOGY

## 2025-05-07 PROCEDURE — 92083 EXTENDED VISUAL FIELD XM: CPT | Performed by: OPHTHALMOLOGY

## 2025-05-07 PROCEDURE — 92014 COMPRE OPH EXAM EST PT 1/>: CPT | Performed by: OPHTHALMOLOGY

## 2025-05-07 ASSESSMENT — REFRACTION_MANIFEST
OD_CYLINDER: SPHERE
OS_CYLINDER: -0.75
OS_VA1: 20/25
OD_AXIS: 025
OS_ADD: +3.00
OD_CYLINDER: +0.25
OS_ADD: +3.00
OS_VA2: 20/25
OD_VA2: 20/25
OS_SPHERE: PL
OS_SPHERE: PLANO
OD_CYLINDER: -0.25
OD_SPHERE: PLANO
OS_CYLINDER: +0.50
OD_ADD: +3.00
OD_ADD: +3.00
OD_SPHERE: +0.25
OS_VA1: 20/25
OD_SPHERE: PLANO
OD_VA2: 20/20
OU_VA: 20/30+1
OS_SPHERE: +0.25
OD_VA1: 20/30
OS_AXIS: 145
OS_ADD: +3.00
OS_AXIS: 040
OS_VA2: 20/20
OD_ADD: +3.00
OD_SPHERE: PLANO
OD_VA2: 20/25
OS_SPHERE: PLANO
OS_CYLINDER: SPHERE
OD_AXIS: 180
OD_SPHERE: +0.25
OS_VA2: 20/25
OS_SPHERE: PLANO
OD_VA1: 20/25

## 2025-05-07 ASSESSMENT — KERATOMETRY
OD_K2POWER_DIOPTERS: 42.50
OD_K1POWER_DIOPTERS: 42.25
OS_AXISANGLE_DEGREES: 072
OS_K2POWER_DIOPTERS: 43.25
OS_K1POWER_DIOPTERS: 42.75
OD_AXISANGLE_DEGREES: 098
METHOD_AUTO_MANUAL: AUTO

## 2025-05-07 ASSESSMENT — REFRACTION_CURRENTRX
OS_SPHERE: PLANO
OD_ADD: +2.00
OD_SPHERE: PLANO
OD_ADD: +2.75
OD_AXIS: 099
OS_CYLINDER: -0.25
OD_ADD: +2.00
OS_ADD: +2.00
OS_AXIS: 085
OS_CYLINDER: -0.25
OD_CYLINDER: -0.25
OD_CYLINDER: -1.00
OS_VPRISM_DIRECTION: PROGS
OD_CYLINDER: -0.25
OD_SPHERE: PLANO
OS_ADD: +2.75
OS_OVR_VA: 20/
OS_SPHERE: PLANO
OD_SPHERE: +1.25
OD_SPHERE: PLANO
OS_SPHERE: PLANO
OD_AXIS: 101
OD_SPHERE: +1.25
OS_OVR_VA: 20/
OD_CYLINDER: SPHERE
OS_SPHERE: PLANO
OD_VPRISM_DIRECTION: PROGS
OD_ADD: +2.75
OS_ADD: +2.25
OD_AXIS: 090
OS_ADD: +2.25
OD_ADD: +2.25
OD_AXIS: 105
OS_CYLINDER: SPHERE
OD_CYLINDER: SPH
OD_VPRISM_DIRECTION: PROGS
OD_ADD: +2.25
OS_OVR_VA: 20/
OS_CYLINDER: SPHERE
OS_ADD: +2.00
OS_VPRISM_DIRECTION: PROGS
OD_VPRISM_DIRECTION: PROGS
OD_OVR_VA: 20/
OD_VPRISM_DIRECTION: PROGS
OS_AXIS: 180
OS_AXIS: 090
OD_VPRISM_DIRECTION: PROGS
OD_OVR_VA: 20/
OS_SPHERE: +0.75
OD_SPHERE: +0.25
OS_VPRISM_DIRECTION: PROGS
OS_CYLINDER: SPH
OD_VPRISM_DIRECTION: PROGS
OS_CYLINDER: SPH
OS_VPRISM_DIRECTION: PROGS
OS_VPRISM_DIRECTION: PROGS
OD_CYLINDER: -0.75
OS_VPRISM_DIRECTION: PROGS
OS_SPHERE: PLANO
OS_ADD: +2.75
OD_OVR_VA: 20/

## 2025-05-07 ASSESSMENT — SUPERFICIAL PUNCTATE KERATITIS (SPK)
OS_SPK: 1+
OD_SPK: 1+

## 2025-05-07 ASSESSMENT — DRY EYES - PHYSICIAN NOTES
OS_GENERALCOMMENTS: INFERIOR
OD_GENERALCOMMENTS: INFERIOR

## 2025-05-07 ASSESSMENT — TONOMETRY
OD_IOP_MMHG: 16
OD_IOP_MMHG: 18
OS_IOP_MMHG: 18
OS_IOP_MMHG: 20

## 2025-05-07 ASSESSMENT — LID POSITION - LOWER LID LAG
OS_LOWER_LID_LAG: LLL 1+
OD_LOWER_LID_LAG: RLL 1+

## 2025-05-07 ASSESSMENT — REFRACTION_AUTOREFRACTION
OS_SPHERE: 0.00
OD_AXIS: 083
OS_CYLINDER: -0.50
OD_SPHERE: +0.50
OD_CYLINDER: +0.50
OS_AXIS: 114

## 2025-05-07 ASSESSMENT — LID EXAM ASSESSMENTS
OS_BLEPHARITIS: LLL LUL 3+
OD_BLEPHARITIS: RLL RUL 3+

## 2025-05-07 ASSESSMENT — VISUAL ACUITY
OS_BCVA: 20/20-2
OD_BCVA: 20/25-1

## 2025-05-07 ASSESSMENT — LID POSITION - PTOSIS
OS_PTOSIS: LUL 1+
OD_PTOSIS: RUL 1+

## 2025-05-07 ASSESSMENT — CONFRONTATIONAL VISUAL FIELD TEST (CVF)
OD_FINDINGS: FULL
OS_FINDINGS: FULL